# Patient Record
Sex: MALE | URBAN - METROPOLITAN AREA
[De-identification: names, ages, dates, MRNs, and addresses within clinical notes are randomized per-mention and may not be internally consistent; named-entity substitution may affect disease eponyms.]

---

## 2022-01-01 ENCOUNTER — EMERGENCY (EMERGENCY)
Facility: HOSPITAL | Age: 37
LOS: 1 days | End: 2022-01-01
Attending: EMERGENCY MEDICINE | Admitting: EMERGENCY MEDICINE

## 2022-01-01 VITALS — HEIGHT: 69 IN | WEIGHT: 251.33 LBS

## 2022-01-01 LAB
FLUAV AG NPH QL: SIGNIFICANT CHANGE UP
FLUBV AG NPH QL: SIGNIFICANT CHANGE UP
GLUCOSE BLDC GLUCOMTR-MCNC: 110 MG/DL — HIGH (ref 70–99)
RSV RNA NPH QL NAA+NON-PROBE: SIGNIFICANT CHANGE UP
SARS-COV-2 RNA SPEC QL NAA+PROBE: SIGNIFICANT CHANGE UP

## 2022-01-01 PROCEDURE — 99285 EMERGENCY DEPT VISIT HI MDM: CPT

## 2022-08-12 NOTE — ED ADULT NURSE NOTE - NSIMPLEMENTINTERV_GEN_ALL_ED
Implemented All Fall Risk Interventions:  [place] to call system. Call bell, personal items and telephone within reach. Instruct patient to call for assistance. Room bathroom lighting operational. Non-slip footwear when patient is off stretcher. Physically safe environment: no spills, clutter or unnecessary equipment. Stretcher in lowest position, wheels locked, appropriate side rails in place. Provide visual cue, wrist band, yellow gown, etc. Monitor gait and stability. Monitor for mental status changes and reorient to person, place, and time. Review medications for side effects contributing to fall risk. Reinforce activity limits and safety measures with patient and family.

## 2022-08-12 NOTE — ED PROVIDER NOTE - OBJECTIVE STATEMENT
Per EMS, patient walked into his workplace, said he had been stung by a bee and was allergic to bees, then collapsed. Patient in asystole upon EMS arrival. ACLS initiated, patient intubated in field. Had brief ROSC, lost pulse just prior to arrival to this facility. Upon arrival, patient in asystole with ACLS in progress.

## 2022-08-12 NOTE — ED PROVIDER NOTE - CLINICAL SUMMARY MEDICAL DECISION MAKING FREE TEXT BOX
Patient arrived intubated in cardiac arrest after bee sting. ACLS continued in ED without ROSC. Patient pronounced at 09:56.

## 2022-08-12 NOTE — ED ADULT TRIAGE NOTE - CHIEF COMPLAINT QUOTE
ORQUIDEA as a notification for cardiac arrest 2/2 anaphylaxis to bee stings. as per EMS, pt ran into building stating he was stung by a bee and he was allergic. pt intubated in the field with a 6.5 ett, given a total of 3 epi in the field and 2mg narcan. has IO to RLE. had 2min of organized rhythm at 944am. arrives with GLENNA device in asystole, ACLS in progress. team at bedside

## 2022-08-12 NOTE — ED PROVIDER NOTE - PHYSICAL EXAMINATION
Appearance: ill-appearing. Eyes: pupils fixed and dilated, no EOMI. Cardiac: pulseless, no heart sounds in 4 quadrants. Pulmonary: No spontaneous respirations. GI: abdomen soft, non-distended, no bowel sounds. MS: no spontaneous movement, no obvious extremity injuries. Neuro: areflexic, unresponsive to painful stimuli. Skin: cool, dry, no rash.

## 2022-08-12 NOTE — ED PROVIDER NOTE - PROGRESS NOTE DETAILS
See code sheet. Patient pronounced at 09:56. Wife now in ED and informed of death. 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 Called ME's office. Spoke with Ms. Farris. cause of death not reportable.
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
## 2022-08-15 DIAGNOSIS — Z20.822 CONTACT WITH AND (SUSPECTED) EXPOSURE TO COVID-19: ICD-10-CM
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
## 2022-08-15 DIAGNOSIS — I46.9 CARDIAC ARREST, CAUSE UNSPECIFIED: ICD-10-CM

## 2022-08-15 DIAGNOSIS — Z91.030 BEE ALLERGY STATUS: ICD-10-CM

## 2022-08-15 DIAGNOSIS — T63.441A TOXIC EFFECT OF VENOM OF BEES, ACCIDENTAL (UNINTENTIONAL), INITIAL ENCOUNTER: ICD-10-CM
